# Patient Record
Sex: FEMALE | Race: BLACK OR AFRICAN AMERICAN | Employment: PART TIME | URBAN - METROPOLITAN AREA
[De-identification: names, ages, dates, MRNs, and addresses within clinical notes are randomized per-mention and may not be internally consistent; named-entity substitution may affect disease eponyms.]

---

## 2017-11-18 ENCOUNTER — OFFICE VISIT (OUTPATIENT)
Dept: URGENT CARE | Facility: URGENT CARE | Age: 22
End: 2017-11-18
Payer: COMMERCIAL

## 2017-11-18 VITALS
DIASTOLIC BLOOD PRESSURE: 77 MMHG | WEIGHT: 293 LBS | OXYGEN SATURATION: 100 % | SYSTOLIC BLOOD PRESSURE: 145 MMHG | BODY MASS INDEX: 43.4 KG/M2 | HEIGHT: 69 IN | TEMPERATURE: 97.4 F | HEART RATE: 92 BPM

## 2017-11-18 DIAGNOSIS — B37.31 YEAST VAGINITIS: ICD-10-CM

## 2017-11-18 DIAGNOSIS — N92.6 IRREGULAR MENSTRUAL CYCLE: ICD-10-CM

## 2017-11-18 DIAGNOSIS — N76.0 BACTERIAL VAGINOSIS: ICD-10-CM

## 2017-11-18 DIAGNOSIS — B96.89 BACTERIAL VAGINOSIS: ICD-10-CM

## 2017-11-18 DIAGNOSIS — R10.2 SUPRAPUBIC ABDOMINAL PAIN: Primary | ICD-10-CM

## 2017-11-18 LAB
ALBUMIN UR-MCNC: NEGATIVE MG/DL
APPEARANCE UR: CLEAR
BETA HCG QUAL IFA URINE: NEGATIVE
BILIRUB UR QL STRIP: NEGATIVE
COLOR UR AUTO: YELLOW
GLUCOSE UR STRIP-MCNC: NEGATIVE MG/DL
HGB UR QL STRIP: NEGATIVE
KETONES UR STRIP-MCNC: NEGATIVE MG/DL
LEUKOCYTE ESTERASE UR QL STRIP: NEGATIVE
NITRATE UR QL: NEGATIVE
PH UR STRIP: 6 PH (ref 5–7)
SOURCE: NORMAL
SP GR UR STRIP: 1.02 (ref 1–1.03)
SPECIMEN SOURCE: ABNORMAL
UROBILINOGEN UR STRIP-ACNC: 0.2 EU/DL (ref 0.2–1)
WET PREP SPEC: ABNORMAL

## 2017-11-18 PROCEDURE — 87491 CHLMYD TRACH DNA AMP PROBE: CPT | Performed by: INTERNAL MEDICINE

## 2017-11-18 PROCEDURE — 99203 OFFICE O/P NEW LOW 30 MIN: CPT | Performed by: INTERNAL MEDICINE

## 2017-11-18 PROCEDURE — 87210 SMEAR WET MOUNT SALINE/INK: CPT | Performed by: INTERNAL MEDICINE

## 2017-11-18 PROCEDURE — 84703 CHORIONIC GONADOTROPIN ASSAY: CPT | Performed by: INTERNAL MEDICINE

## 2017-11-18 PROCEDURE — 81003 URINALYSIS AUTO W/O SCOPE: CPT | Performed by: INTERNAL MEDICINE

## 2017-11-18 PROCEDURE — 87591 N.GONORRHOEAE DNA AMP PROB: CPT | Performed by: INTERNAL MEDICINE

## 2017-11-18 RX ORDER — METRONIDAZOLE 500 MG/1
500 TABLET ORAL 2 TIMES DAILY
Qty: 14 TABLET | Refills: 0 | Status: SHIPPED | OUTPATIENT
Start: 2017-11-18 | End: 2020-10-12

## 2017-11-18 RX ORDER — FLUCONAZOLE 150 MG/1
150 TABLET ORAL ONCE
Qty: 1 TABLET | Refills: 0 | Status: SHIPPED | OUTPATIENT
Start: 2017-11-18 | End: 2017-11-18

## 2017-11-18 RX ORDER — METRONIDAZOLE 7.5 MG/G
1 GEL VAGINAL 2 TIMES DAILY
Qty: 70 G | Refills: 0 | Status: SHIPPED | OUTPATIENT
Start: 2017-11-18 | End: 2017-11-25

## 2017-11-18 ASSESSMENT — ENCOUNTER SYMPTOMS
CONSTIPATION: 0
COUGH: 0
DIARRHEA: 0
SORE THROAT: 0
BRUISES/BLEEDS EASILY: 0
FEVER: 0
DYSURIA: 1
NAUSEA: 0
BLOOD IN STOOL: 0
CHILLS: 0
VOMITING: 0
HEADACHES: 0
HEMATURIA: 0
FREQUENCY: 0

## 2017-11-18 NOTE — NURSING NOTE
"Chief Complaint   Patient presents with     Urgent Care     Abdominal Pain     c/o stomach pain for 1 day       Initial /77  Pulse 92  Temp 97.4  F (36.3  C) (Tympanic)  Ht 5' 9\" (1.753 m)  Wt (!) 330 lb (149.7 kg)  LMP 09/25/2017  SpO2 100%  Breastfeeding? No  BMI 48.73 kg/m2 Estimated body mass index is 48.73 kg/(m^2) as calculated from the following:    Height as of this encounter: 5' 9\" (1.753 m).    Weight as of this encounter: 330 lb (149.7 kg).  Medication Reconciliation: complete   Susan Goldstein MA    "

## 2017-11-18 NOTE — PATIENT INSTRUCTIONS
abd pains Could be related to abnormal menstrual cycle and build up of period blood  Or the bacterial vaginosis / yeast  Try ibuprofen with food 3 x day for next few days  Recheck with primary clinic next week.    treatment for bacterial vaginosis & yeast infection    Specimen Description 11/18/2017  1:45 PM 83   Vagina   Wet Prep (Abnormal) 11/18/2017  1:45 PM 83   Yeast seen   Wet Prep (Abnormal) 11/18/2017  1:45 PM 83   Clue cells seen   Wet Prep 11/18/2017  1:45 PM 83   No Trichomonas seen     Component      Latest Ref Rng & Units 11/18/2017   Color Urine       Yellow   Appearance Urine       Clear   Glucose Urine      NEG:Negative mg/dL Negative   Bilirubin Urine      NEG:Negative Negative   Ketones Urine      NEG:Negative mg/dL Negative   Specific Gravity Urine      1.003 - 1.035 1.025   Blood Urine      NEG:Negative Negative   pH Urine      5.0 - 7.0 pH 6.0   Protein Albumin Urine      NEG:Negative mg/dL Negative   Urobilinogen Urine      0.2 - 1.0 EU/dL 0.2   Nitrite Urine      NEG:Negative Negative   Leukocyte Esterase Urine      NEG:Negative Negative   Source       Midstream Urine   Beta HCG Qual IFA Urine      NEG:Negative    Negative           Vaginal Infection: Understanding the Vaginal Environment  The vagina is a canal. It connects the uterus (womb) to the outside of the body. The vagina is home to many types of bacteria and other tiny organisms. These different bacteria most often stay balanced in number. This keeps the vagina healthy. If the balance changes, an infection may result.   A Healthy Environment  Many types of bacteria are present in a healthy vagina. They don t cause problems if their amounts stay balanced. Small amounts of yeast may also be present without causing problems. The most common type of bacteria in the vagina is lactobacillus. It helps keep the vagina at a low pH. A low pH keeps bad bacteria from taking over.  Normal Vaginal Discharge  The vagina makes fluid. It is sent out  as discharge. This keeps the vagina healthy. Normal discharge can be clear, white, or yellowish. Most women find that normal discharge varies in amount and color through the month.  An Unhealthy Environment  The vaginal environment may get out of balance. This may result in a vaginal infection. There are a few reasons this can happen. The pH may have changed. The amount of one organism, such as yeast, may increase. Or an outside organism may get into the vagina and throw off the balance:    Bacterial vaginosis (BV). BV is due to an imbalance in the normal bacteria in the vagina. Lactobacillus bacteria decrease. As a result, the numbers of bad bacteria increase.    Candidiasis(yeast infection). Yeast is a type of fungus. A yeast infection occurs when yeast cells in the vagina increase. They then attack vaginal tissues. A type of yeast called Candida albicans is often involved.    Trichomoniasis ( trich ). Trich is a parasite. It is passed from one person to another during sex. Men with trich often don t have any symptoms. In women, it can take weeks or months before symptoms appear.    5766-5399 [employer]. [address] [place] [address]. All rights reserved. This information is not intended as a substitute for professional medical care. Always follow your healthcare professional's instructions.  This information has been modified by your health care provider with permission from the publisher.

## 2017-11-18 NOTE — MR AVS SNAPSHOT
After Visit Summary   11/18/2017    Boogie Linares    MRN: [id number]           Patient Information     Date Of Birth          1995        Visit Information        Provider Department      11/18/2017 12:35 PM Patricia Pickering MD [facility]        Today's Diagnoses     Suprapubic abdominal pain    -  1    Irregular menstrual cycle        Bacterial vaginosis        Yeast vaginitis          Care Instructions    abd pains Could be related to abnormal menstrual cycle and build up of period blood  Or the bacterial vaginosis / yeast  Try ibuprofen with food 3 x day for next few days  Recheck with primary clinic next week.    treatment for bacterial vaginosis & yeast infection    Specimen Description 11/18/2017  1:45 PM 83   Vagina   Wet Prep (Abnormal) 11/18/2017  1:45 PM 83   Yeast seen   Wet Prep (Abnormal) 11/18/2017  1:45 PM 83   Clue cells seen   Wet Prep 11/18/2017  1:45 PM 83   No Trichomonas seen     Component      Latest Ref Rng & Units 11/18/2017   Color Urine       Yellow   Appearance Urine       Clear   Glucose Urine      NEG:Negative mg/dL Negative   Bilirubin Urine      NEG:Negative Negative   Ketones Urine      NEG:Negative mg/dL Negative   Specific Gravity Urine      1.003 - 1.035 1.025   Blood Urine      NEG:Negative Negative   pH Urine      5.0 - 7.0 pH 6.0   Protein Albumin Urine      NEG:Negative mg/dL Negative   Urobilinogen Urine      0.2 - 1.0 EU/dL 0.2   Nitrite Urine      NEG:Negative Negative   Leukocyte Esterase Urine      NEG:Negative Negative   Source       Midstream Urine   Beta HCG Qual IFA Urine      NEG:Negative    Negative           Vaginal Infection: Understanding the Vaginal Environment  The vagina is a canal. It connects the uterus (womb) to the outside of the body. The vagina is home to many types of bacteria and other tiny organisms. These different bacteria most often stay balanced in number. This keeps the vagina healthy. If  the balance changes, an infection may result.   A Healthy Environment  Many types of bacteria are present in a healthy vagina. They don t cause problems if their amounts stay balanced. Small amounts of yeast may also be present without causing problems. The most common type of bacteria in the vagina is lactobacillus. It helps keep the vagina at a low pH. A low pH keeps bad bacteria from taking over.  Normal Vaginal Discharge  The vagina makes fluid. It is sent out as discharge. This keeps the vagina healthy. Normal discharge can be clear, white, or yellowish. Most women find that normal discharge varies in amount and color through the month.  An Unhealthy Environment  The vaginal environment may get out of balance. This may result in a vaginal infection. There are a few reasons this can happen. The pH may have changed. The amount of one organism, such as yeast, may increase. Or an outside organism may get into the vagina and throw off the balance:    Bacterial vaginosis (BV). BV is due to an imbalance in the normal bacteria in the vagina. Lactobacillus bacteria decrease. As a result, the numbers of bad bacteria increase.    Candidiasis(yeast infection). Yeast is a type of fungus. A yeast infection occurs when yeast cells in the vagina increase. They then attack vaginal tissues. A type of yeast called Candida albicans is often involved.    Trichomoniasis ( trich ). Trich is a parasite. It is passed from one person to another during sex. Men with trich often don t have any symptoms. In women, it can take weeks or months before symptoms appear.    8251-0411 [employer]. [address] [place] [address]. All rights reserved. This information is not intended as a substitute for professional medical care. Always follow your healthcare professional's instructions.  This information has been modified by your health care provider with permission from the publisher.                Follow-ups after your  "visit        Who to contact     If you have questions or need follow up information about today's clinic visit or your schedule please contact [facility] directly at [phone].  Normal or non-critical lab and imaging results will be communicated to you by MyChart, letter or phone within 4 business days after the clinic has received the results. If you do not hear from us within 7 days, please contact the clinic through MyChart or phone. If you have a critical or abnormal lab result, we will notify you by phone as soon as possible.  Submit refill requests through [employer] or call your pharmacy and they will forward the refill request to us. Please allow 3 business days for your refill to be completed.          Additional Information About Your Visit        [facility] Information     [employer] lets you send messages to your doctor, view your test results, renew your prescriptions, schedule appointments and more. To sign up, go to [email or web address] . Click on \"Log in\" on the left side of the screen, which will take you to the Welcome page. Then click on \"Sign up Now\" on the right side of the page.     You will be asked to enter the access code listed below, as well as some personal information. Please follow the directions to create your username and password.     Your access code is: [email or web address]  Expires: 2018  2:18 PM     Your access code will  in 90 days. If you need help or a new code, please call your [facility] clinic or [phone].        Care EveryWhere ID     This is your Care EveryWhere ID. This could be used by other organizations to access your [facility] medical records  [id number]        Your Vitals Were     Pulse Temperature Height Last Period Pulse Oximetry Breastfeeding?    92 97.4  F (36.3  C) (Tympanic) 5' 9\" (1.753 m) 2017 100% No    BMI (Body Mass Index)                   48.73 kg/m2            Blood Pressure from Last 3 Encounters:   17 145/77    " Weight from Last 3 Encounters:   11/18/17 (!) 330 lb (149.7 kg)              We Performed the Following     *UA reflex to Microscopic and Culture ([facility] [facility] (except [place] and [place])     Beta HCG qual IFA urine - [facility] and [place]     CHLAMYDIA TRACHOMATIS PCR     NEISSERIA GONORRHOEA PCR     Wet prep          Today's Medication Changes          These changes are accurate as of: 11/18/17  2:18 PM.  If you have any questions, ask your nurse or doctor.               Start taking these medicines.        Dose/Directions    fluconazole 150 MG tablet   Commonly known as:  DIFLUCAN   Used for:  Yeast vaginitis   Started by:  Patricia Pickering MD        Dose:  150 mg   Take 1 tablet (150 mg) by mouth once for 1 dose   Quantity:  1 tablet   Refills:  0       metroNIDAZOLE 0.75 % vaginal gel   Commonly known as:  METROGEL   Used for:  Bacterial vaginosis   Started by:  Patricia Pickering MD        Dose:  1 applicator   Place 1 applicator (5 g) vaginally 2 times daily for 7 days   Quantity:  70 g   Refills:  0       metroNIDAZOLE 500 MG tablet   Commonly known as:  FLAGYL   Used for:  Bacterial vaginosis   Started by:  Patricia Pickering MD        Dose:  500 mg   Take 1 tablet (500 mg) by mouth 2 times daily   Quantity:  14 tablet   Refills:  0            Where to get your medicines      [facility] [address] [place] [address] & [address] [address]    Hours:  24-hours Phone:  [phone]     fluconazole 150 MG tablet    metroNIDAZOLE 500 MG tablet         Some of these will need a paper prescription and others can be bought over the counter.  Ask your nurse if you have questions.     Bring a paper prescription for each of these medications     metroNIDAZOLE 0.75 % vaginal gel                Primary Care Provider Office Phone # Fax #    [facility]  [phone]       [address] [place] [address]        Equal Access to Services     RAND TUCKER : Hadii aad ku hadsteven Canela, wacarrida luqadaha, qaybta kaalmada baileyvivienne, yoko karynain hayaaskylar avaloselvis soto laura wiseman. So [facility] [phone].    ATENCIÓN: Si habla español, tiene a farmer disposición servicios gratuitos de asistencia lingüística. Bibiame al [phone].    We comply with applicable federal civil rights laws and [place] laws. We do not discriminate on the basis of race, color, national origin, age, disability, sex, sexual orientation, or gender identity.            Thank you!     Thank you for choosing [facility]  for your care. Our goal is always to provide you with excellent care. Hearing back from our patients is one way we can continue to improve our services. Please take a few minutes to complete the written survey that you may receive in the mail after your visit with us. Thank you!             Your Updated Medication List - Protect others around you: Learn how to safely use, store and throw away your medicines at www.disposemymeds.org.          This list is accurate as of: 11/18/17  2:18 PM.  Always use your most recent med list.                   Brand Name Dispense Instructions for use Diagnosis    ADDERALL PO           fluconazole 150 MG tablet    DIFLUCAN    1 tablet    Take 1 tablet (150 mg) by mouth once for 1 dose    Yeast vaginitis       metroNIDAZOLE 0.75 % vaginal gel    METROGEL    70 g    Place 1 applicator (5 g) vaginally 2 times daily for 7 days    Bacterial vaginosis       metroNIDAZOLE 500 MG tablet    FLAGYL    14 tablet    Take 1 tablet (500 mg) by mouth 2 times daily    Bacterial vaginosis       ZOLOFT PO      Take by mouth daily

## 2017-11-18 NOTE — PROGRESS NOTES
SUBJECTIVE:   Boogie Linares is a 22 year old female presenting with a chief complaint of   Chief Complaint   Patient presents with     Urgent Care     Abdominal Pain     c/o stomach pain for 1 day   .  Takes pill OCP, suppose to get period end of OCt.  Took 3 UPT, all neg  Had intercourse 1 week ago, then had excessive bleeding  [place] yesterday,then no bleeding  Mother brought up concern for STD.    Location: suprapubic last night/today  Pain character: sharp, constant    Course of Illness: stable.  Exacerbated by: nothing  Relieved by: nothing.  Nothing tried  Associated Symptoms:   denies belching / bloating      Surgical History: NO abd surg        Review of Systems   Constitutional: Negative for chills and fever.   HENT: Negative for sore throat.    Respiratory: Negative for cough.    Cardiovascular: Negative for chest pain.   Gastrointestinal: Negative for blood in stool, constipation, diarrhea, melena, nausea and vomiting.   Genitourinary: Positive for dysuria. Negative for frequency, hematuria and urgency.        Hurt to bleed after intercourse yesterday   Neurological: Negative for headaches.   Endo/Heme/Allergies: Does not bruise/bleed easily.         Past Medical History:   Diagnosis Date     Depression      Narcolepsy      Current Outpatient Prescriptions   Medication Sig Dispense Refill     Amphetamine-Dextroamphetamine (ADDERALL PO)        Sertraline HCl (ZOLOFT PO) Take by mouth daily       fluconazole (DIFLUCAN) 150 MG tablet Take 1 tablet (150 mg) by mouth once for 1 dose 1 tablet 0     metroNIDAZOLE (FLAGYL) 500 MG tablet Take 1 tablet (500 mg) by mouth 2 times daily 14 tablet 0     metroNIDAZOLE (METROGEL) 0.75 % vaginal gel Place 1 applicator (5 g) vaginally 2 times daily for 7 days 70 g 0     Social History   Substance Use Topics     Smoking status: Never Smoker     Smokeless tobacco: Never Used     Alcohol use Not on file       OBJECTIVE  /77  Pulse 92  Temp 97.4  F (36.3  " C) (Tympanic)  Ht 5' 9\" (1.753 m)  Wt (!) 330 lb (149.7 kg)  LMP 09/25/2017  SpO2 100%  Breastfeeding? No  BMI 48.73 kg/m2    Physical Exam   Constitutional: She is well-developed, well-nourished, and in no distress.   Cardiovascular: Normal rate and regular rhythm.    Pulmonary/Chest: Effort normal and breath sounds normal.   Abdominal: Soft. Bowel sounds are normal.   Mild suprapubic tenderness without guarding / rebound   Musculoskeletal:   No CVA tenderness       Labs:  Results for orders placed or performed in visit on 11/18/17 (from the past 24 hour(s))   *UA reflex to Microscopic and Culture ([facility] (except [place] and [place])   Result Value Ref Range    Color Urine Yellow     Appearance Urine Clear     Glucose Urine Negative NEG^Negative mg/dL    Bilirubin Urine Negative NEG^Negative    Ketones Urine Negative NEG^Negative mg/dL    Specific Gravity Urine 1.025 1.003 - 1.035    Blood Urine Negative NEG^Negative    pH Urine 6.0 5.0 - 7.0 pH    Protein Albumin Urine Negative NEG^Negative mg/dL    Urobilinogen Urine 0.2 0.2 - 1.0 EU/dL    Nitrite Urine Negative NEG^Negative    Leukocyte Esterase Urine Negative NEG^Negative    Source Midstream Urine    Beta HCG qual IFA urine - [facility] [place]   Result Value Ref Range    Beta HCG Qual IFA Urine Negative NEG^Negative      Wet prep   Result Value Ref Range    Specimen Description Vagina     Wet Prep Yeast seen (A)     Wet Prep Clue cells seen (A)     Wet Prep No Trichomonas seen        X-Ray was not done.    ASSESSMENT:      ICD-10-CM    1. Suprapubic abdominal pain R10.2 *UA reflex to Microscopic and Culture ([facility] (except [place] and [place])     Wet prep     NEISSERIA GONORRHOEA PCR     CHLAMYDIA TRACHOMATIS PCR   2. Irregular menstrual cycle N92.6 Beta HCG qual IFA urine - [facility] and [place]   3. Bacterial vaginosis N76.0 metroNIDAZOLE (FLAGYL) 500 MG tablet    B96.89 metroNIDAZOLE (METROGEL) 0.75 % " vaginal gel   4. Yeast vaginitis B37.3 fluconazole (DIFLUCAN) 150 MG tablet        Medical Decision Making:        PLAN:      Patient Instructions     abd pains Could be related to abnormal menstrual cycle and build up of period blood  Or the bacterial vaginosis / yeast  Try ibuprofen with food 3 x day for next few days  Recheck with primary clinic next week.    treatment for bacterial vaginosis & yeast infection    Specimen Description 11/18/2017  1:45 PM 83   Vagina   Wet Prep (Abnormal) 11/18/2017  1:45 PM 83   Yeast seen   Wet Prep (Abnormal) 11/18/2017  1:45 PM 83   Clue cells seen   Wet Prep 11/18/2017  1:45 PM 83   No Trichomonas seen     Component      Latest Ref Rng & Units 11/18/2017   Color Urine       Yellow   Appearance Urine       Clear   Glucose Urine      NEG:Negative mg/dL Negative   Bilirubin Urine      NEG:Negative Negative   Ketones Urine      NEG:Negative mg/dL Negative   Specific Gravity Urine      1.003 - 1.035 1.025   Blood Urine      NEG:Negative Negative   pH Urine      5.0 - 7.0 pH 6.0   Protein Albumin Urine      NEG:Negative mg/dL Negative   Urobilinogen Urine      0.2 - 1.0 EU/dL 0.2   Nitrite Urine      NEG:Negative Negative   Leukocyte Esterase Urine      NEG:Negative Negative   Source       Midstream Urine   Beta HCG Qual IFA Urine      NEG:Negative    Negative           Vaginal Infection: Understanding the Vaginal Environment  The vagina is a canal. It connects the uterus (womb) to the outside of the body. The vagina is home to many types of bacteria and other tiny organisms. These different bacteria most often stay balanced in number. This keeps the vagina healthy. If the balance changes, an infection may result.   A Healthy Environment  Many types of bacteria are present in a healthy vagina. They don t cause problems if their amounts stay balanced. Small amounts of yeast may also be present without causing problems. The most common type of bacteria in the vagina is lactobacillus.  It helps keep the vagina at a low pH. A low pH keeps bad bacteria from taking over.  Normal Vaginal Discharge  The vagina makes fluid. It is sent out as discharge. This keeps the vagina healthy. Normal discharge can be clear, white, or yellowish. Most women find that normal discharge varies in amount and color through the month.  An Unhealthy Environment  The vaginal environment may get out of balance. This may result in a vaginal infection. There are a few reasons this can happen. The pH may have changed. The amount of one organism, such as yeast, may increase. Or an outside organism may get into the vagina and throw off the balance:    Bacterial vaginosis (BV). BV is due to an imbalance in the normal bacteria in the vagina. Lactobacillus bacteria decrease. As a result, the numbers of bad bacteria increase.    Candidiasis(yeast infection). Yeast is a type of fungus. A yeast infection occurs when yeast cells in the vagina increase. They then attack vaginal tissues. A type of yeast called Candida albicans is often involved.    Trichomoniasis ( trich ). Trich is a parasite. It is passed from one person to another during sex. Men with trich often don t have any symptoms. In women, it can take weeks or months before symptoms appear.    1808-0463 [employer] [address] [place] [address], [place] [address]. All rights reserved. This information is not intended as a substitute for professional medical care. Always follow your healthcare professional's instructions.  This information has been modified by your health care provider with permission from the publisher.

## 2017-11-20 LAB
C TRACH DNA SPEC QL NAA+PROBE: NEGATIVE
N GONORRHOEA DNA SPEC QL NAA+PROBE: NEGATIVE
SPECIMEN SOURCE: NORMAL
SPECIMEN SOURCE: NORMAL

## 2019-02-05 ENCOUNTER — RECORDS - HEALTHEAST (OUTPATIENT)
Dept: ADMINISTRATIVE | Facility: OTHER | Age: 24
End: 2019-02-05

## 2020-10-12 ENCOUNTER — OFFICE VISIT (OUTPATIENT)
Dept: OBGYN | Facility: CLINIC | Age: 25
End: 2020-10-12
Payer: COMMERCIAL

## 2020-10-12 VITALS — HEART RATE: 77 BPM | BODY MASS INDEX: 48.58 KG/M2 | WEIGHT: 293 LBS

## 2020-10-12 DIAGNOSIS — Z31.69 ENCOUNTER FOR PRECONCEPTION CONSULTATION: ICD-10-CM

## 2020-10-12 DIAGNOSIS — N92.0 MENORRHAGIA WITH REGULAR CYCLE: Primary | ICD-10-CM

## 2020-10-12 DIAGNOSIS — E66.01 MORBID OBESITY (H): ICD-10-CM

## 2020-10-12 PROCEDURE — 99203 OFFICE O/P NEW LOW 30 MIN: CPT | Performed by: OBSTETRICS & GYNECOLOGY

## 2020-10-12 SDOH — ECONOMIC STABILITY: TRANSPORTATION INSECURITY
IN THE PAST 12 MONTHS, HAS LACK OF TRANSPORTATION KEPT YOU FROM MEETINGS, WORK, OR FROM GETTING THINGS NEEDED FOR DAILY LIVING?: NOT ASKED

## 2020-10-12 SDOH — ECONOMIC STABILITY: TRANSPORTATION INSECURITY
IN THE PAST 12 MONTHS, HAS THE LACK OF TRANSPORTATION KEPT YOU FROM MEDICAL APPOINTMENTS OR FROM GETTING MEDICATIONS?: NOT ASKED

## 2020-10-12 SDOH — ECONOMIC STABILITY: FOOD INSECURITY: WITHIN THE PAST 12 MONTHS, THE FOOD YOU BOUGHT JUST DIDN'T LAST AND YOU DIDN'T HAVE MONEY TO GET MORE.: NOT ASKED

## 2020-10-12 SDOH — ECONOMIC STABILITY: FOOD INSECURITY: WITHIN THE PAST 12 MONTHS, YOU WORRIED THAT YOUR FOOD WOULD RUN OUT BEFORE YOU GOT MONEY TO BUY MORE.: NOT ASKED

## 2020-10-12 SDOH — ECONOMIC STABILITY: INCOME INSECURITY: HOW HARD IS IT FOR YOU TO PAY FOR THE VERY BASICS LIKE FOOD, HOUSING, MEDICAL CARE, AND HEATING?: NOT ASKED

## 2020-10-12 NOTE — PATIENT INSTRUCTIONS
If you have any questions regarding your visit, Please contact your care team.     [employer]: [phone]  [facility] CLINIC HOURS TELEPHONE NUMBER       Killian Milton M.D.      Neal Wilks-Medical Assistant    Yoly-  Greta-     Monday-[place]  8:00a.m-4:45 p.m  Tuesday-[place]  9:00a.m-4:00 p.m  Wednesday-[place] 8:00a.m-4:45 p.m.  Thursday-[place]  8:00a.m-4:45 p.m.  Friday-[place]  8:00a.m-4:45 p.m. [facility]  [address]  [place], [place] [address] [phone] ask [facility]  [phone] Fax  Imaging [phone]    [facility]  [address]  [place], [place] [address] [phone]    [facility]  [address] [place] [id number] [phone] [facility]  [phone] Fax  Imaging [phone]     Urgent Care locations:    [facility] Monday-Friday  5 pm - 9 pm  Saturday and Sunday   9 am - 5 pm  Monday-Friday   11 am - 9 pm  Saturday and Sunday   9 am - 5 pm   [phone]   If you need a medication refill, please contact your pharmacy. Please allow 3 business days for your refill to be completed.  As always, Thank you for trusting us with your healthcare needs!

## 2020-10-13 NOTE — PROGRESS NOTES
OB-GYN Problem-Oriented Visit or Consultation      Boogie Linares is a 25 year old year old P 0 who presents with a chief complaint of history of left salpingectomy for ectopic.  Referred by self.  Patient's last menstrual period was 07/14/2020 (exact date).    HPI:     Had planned pregnancy this yr but had laparoscopy with left salpingectomy for ectopic pregnancy 6/9/20 at [facility] per patient. Concerned about pelvic condition following a psychic reading. Desires ultrasound. LMP 10/11. Menorrhagia. Menses q 28-30 days x 4-5 days. No pain. Contraceptive method is none.    Past medical, obstetrical, surgical, family and social history reviewed and as noted or updated in chart.     Allergies, meds and supplements are as noted or updated in chart.      ROS:   Systems reviewed include:  constitutional, breast, gastrointestinal, genitourinary, psychological, hematologic/lymphatic and endocrine.    These systems were negative for significant symptoms except for the following additional: none; see HPI.    EXAM:  VS as noted. Pulse 77   Wt 149.2 kg (329 lb)   LMP 07/14/2020 (Exact Date)   Breastfeeding No   BMI 48.58 kg/m    Constitutionally normal.     Exam not repeated at this time.    Assessment:   Encounter Diagnoses   Name Primary?     Menorrhagia with regular cycle Yes     Morbid obesity (H)          Plan and Recommendations:   Total encounter time (physician together with patient) = 30min. Direct counseling, education and care coordination time (physician together with patient) = 20min. This included the following:   I reviewed the condition, causes, differential diagnosis, prognosis, evaluation and management considerations and options.  Questions answered and information given. See orders.     Check pelvic ultrasound. Discussed later HSG if unable to conceive within a year.   Preconception counseling reviewed as needed including cycle timing and optimization, medical status, meds, vaccines, exposures,  nutrition, folic acid, family history, genetic screening (CF carrier scr, etc.), social issues and any applicable specific risk factors.   Encouraged weight loss.       A/P:  Boogie was seen today for follow up.    Diagnoses and all orders for this visit:    Menorrhagia with regular cycle  -     US Pelvic Complete with Transvaginal; Future    Morbid obesity (H)        Killian Milton MD

## 2020-10-14 ENCOUNTER — ANCILLARY PROCEDURE (OUTPATIENT)
Dept: ULTRASOUND IMAGING | Facility: CLINIC | Age: 25
End: 2020-10-14
Attending: OBSTETRICS & GYNECOLOGY
Payer: COMMERCIAL

## 2020-10-14 DIAGNOSIS — N92.0 MENORRHAGIA WITH REGULAR CYCLE: ICD-10-CM

## 2021-01-11 ENCOUNTER — PRENATAL OFFICE VISIT (OUTPATIENT)
Dept: OBGYN | Facility: CLINIC | Age: 26
End: 2021-01-11
Payer: COMMERCIAL

## 2021-01-11 VITALS — BODY MASS INDEX: 43.4 KG/M2 | HEIGHT: 69 IN | WEIGHT: 293 LBS

## 2021-01-11 DIAGNOSIS — Z34.90 SUPERVISION OF NORMAL PREGNANCY: Primary | ICD-10-CM

## 2021-01-11 PROCEDURE — 99207 PR NO CHARGE NURSE ONLY: CPT

## 2021-01-11 ASSESSMENT — MIFFLIN-ST. JEOR: SCORE: 2255.89

## 2021-01-11 NOTE — PROGRESS NOTES
Telephone visit with pt for New Prenatal Intake and Education. This is patient's second pregnancy. She had a recent ectopic pregnancy on 6/2/2020.  Pt requests to be seen right away by Dr. Milton for prenatal care and US rather than waiting for 10-12 weeks due to this.  Handouts reviewed and given. Scheduled for New Prenatal with Dr. iMlton on 1/18/2021.       Prenatal OB Questionnaire  Patient supplied answers from flow sheet for:  Prenatal OB Questionnaire.  Past Medical History  Diabetes?: No  Hypertension : No  Heart disease, mitral valve prolapse or rheumatic fever?: No  An autoimmune disease such as lupus or rheumatoid arthritis?: No  Kidney disease or urinary tract infection?: No  Epilepsy, seizures or spells?: No  Migraine headaches?: No  A stroke or loss of function or sensation?: No  Any other neurological problems?: No  Have you ever been treated for depression?: (!) Yes  Are you having problems with crying spells or loss of self-esteem?: No  Have you ever required psychiatric care?: No  Have you ever had hepatitis, liver disease or jaundice?: No  Have you been treated for blood clots in your veins, deep vein thromosis, inflammation in the veins, thrombosis, phlebitis, pulmonary embolism or varicosities?: No  Have you had excessive bleeding after surgery or dental work?: No  Do you bleed more than other women after a cut or scratch?: No  Do you have a history of anemia?: No  Have you ever had thyroid problems or taken thyroid medication?: No   Do you have any endocrine problems?: No  Have you ever been in a major accident or suffered serious trauma?: No  Within the last year, has anyone hit, slapped, kicked or otherwise hurt you?: No  In the last year, has anyone forced you to have sex when you didn't want to?: No    Past Medical History 2   Have you ever received a blood transfusion?: (!) Yes(2020 during ectopic.  pt believes she recieved blood)  Would you refuse a blood transfusion if a doctor judged it to  be medically necessary?: No  Does anyone in your home smoke?: No  Do you use tobacco products?: No  Do you drink beer, wine or hard liquor?: No  Do you use any of the following: marijuana, speed, cocaine, heroin, hallucinogens or other drugs?: No   Is your blood type Rh negative?: Unknown  Have you ever had abnormal antibodies in your blood?: Unknown  Have you ever had asthma?: Unknown  Have you ever had tuberculosis?: Unknown  Do you have any allergies to drugs or over-the-counter medications?: (!) Yes  Allergies: Dust Mites, Aspartame, Ethanol, Venlafaxine, Hydrochloride, Sertraline: No  Have you had any breast problems?: No  Have you ever ?: No  Have you had any gynecological surgical procedures such as cervical conization, a LEEP procedure, laser treatment, cryosurgery of the cervix or a dilation and curettage, etc?: No  Have you ever had any other surgical procedures?: (!) Yes  Have you been hospitalized for a nonsurgical reason excluding normal delivery?: No  Have you ever had any anesthetic complications?: No  Have you ever had an abnormal pap smear?: No    Past Medical History (Continued)  Do you have a history of abnormalities of the uterus?: No  Did your mother take ASHLY or any other hormones when she was pregnant with you?: No  Did it take you more than a year to become pregnant?: No  Have you ever been evaluated or treated for infertility?: No  Is there a history of medical problems in your family, which you feel may be important to this pregnancy?: No  Do you have any other problems we have not asked about which you feel may be important to this pregnancy?: No    Symptoms since last menstrual period  Do you have any of the following symptoms: abdominal pain, blood in stools or urine, chest pain, shortness of breath, coughing or vomiting up blood, your heart racing or skipping beats, nausea and vomiting, pain on urination or vaginal discharge or bleed: No  Current medications, including  over-the-counter medications, you are using? (If not applicable answer none): prenatal vitamins.  Zoloft  Will the patient be 35 years old or older at the time of delivery?: No    Has the patient, baby's father or anyone in either family had:  Thalassemia (Italian, Greek, Mediterranean or  background only) and an MCV result less than 80?: No  Neural tube defect such as meningomyelocele, spina bifida or anencephaly?: No  Congenital heart defect?: No  Down's Syndrome?: No  Jose Roberto-Sachs disease (Adventism, Cajun, Syrian-[place])?: No  Sickle cell disease or trait ()?: No  Hemophilia or other inherited problems of blood?: No  Muscular dystrophy?: No  Cystic fibrosis?: No  [place]'s chorea?: No  Mental retardation/autism?: No  Any other inherited genetic or chromosomal disorder?: No  Maternal metabolic disorder (e.g Insulin-dependent diabetes, PKU)?: No  A child with birth defects not listed above?: No  Recurrent pregnancy loss or stillbirth?: No   Has the patient had any medications/street drugs/alcohol since her last menstrual period?: (!) Yes(marijuana)  Does the patient or baby's father have any other genetic risks?: No    Infection History   Do you object to being tested for Hepatitis B?: No  Do you object to being tested for HIV?: No   Do you feel that you are at high risk for coming in contact with the AIDS virus?: No  Have you ever been treated for tuberculosis?: No  Have you ever had a positive skin test for tuberculosis?: No  Do you live with someone who has tuberculosis?: No  Have you ever been exposed to tuberculosis?: No  Do you have genital herpes?: No  Does your partner have genital herpes?: No  Have you had a viral illness since your last period?: No  Have you ever had gonorrhea, chlamydia, syphilis, venereal warts, trichomoniasis, pelvic inflammatory disease or any other sexually transmitted disease?: No  Do you know if you are a genital group B streptococcus carrier?: Unknown  Have you had  chicken pox/varicella?: (!) Yes   Have you been vaccinated against chicken Pox?: No  Have you had any other infectious diseases?: No      Allergies as of 1/11/2021:    Allergies as of 01/11/2021 - Reviewed 10/13/2020   Allergen Reaction Noted     Penicillins  11/18/2017     Amoxicillin Rash 11/18/2017     Erythromycin Rash 11/18/2017       Current medications are:  Current Outpatient Medications   Medication Sig Dispense Refill     Amphetamine-Dextroamphetamine (ADDERALL PO)        Sertraline HCl (ZOLOFT PO) Take by mouth daily           Early ultrasound screening tool:    Does patient have irregular periods?  No  Did patient use hormonal birth control in the three months prior to positive urine pregnancy test? No  Is the patient breastfeeding?  No  Is the patient 10 weeks or greater at time of education visit?  No    Jessica Dunn RN

## 2021-01-18 ENCOUNTER — PRENATAL OFFICE VISIT (OUTPATIENT)
Dept: OBGYN | Facility: CLINIC | Age: 26
End: 2021-01-18
Payer: COMMERCIAL

## 2021-01-18 VITALS — BODY MASS INDEX: 43.4 KG/M2 | WEIGHT: 293 LBS | OXYGEN SATURATION: 98 % | HEART RATE: 81 BPM | HEIGHT: 69 IN

## 2021-01-18 DIAGNOSIS — Z34.80 SUPERVISION OF OTHER NORMAL PREGNANCY, ANTEPARTUM: Primary | ICD-10-CM

## 2021-01-18 LAB
ABO + RH BLD: NORMAL
ABO + RH BLD: NORMAL
BLD GP AB SCN SERPL QL: NORMAL
BLOOD BANK CMNT PATIENT-IMP: NORMAL
ERYTHROCYTE [DISTWIDTH] IN BLOOD BY AUTOMATED COUNT: 17.2 % (ref 10–15)
HCT VFR BLD AUTO: 33.2 % (ref 35–47)
HGB BLD-MCNC: 10.5 G/DL (ref 11.7–15.7)
MCH RBC QN AUTO: 24.8 PG (ref 26.5–33)
MCHC RBC AUTO-ENTMCNC: 31.6 G/DL (ref 31.5–36.5)
MCV RBC AUTO: 79 FL (ref 78–100)
PLATELET # BLD AUTO: 372 10E9/L (ref 150–450)
RBC # BLD AUTO: 4.23 10E12/L (ref 3.8–5.2)
SPECIMEN EXP DATE BLD: NORMAL
WBC # BLD AUTO: 14.2 10E9/L (ref 4–11)

## 2021-01-18 PROCEDURE — 86762 RUBELLA ANTIBODY: CPT | Performed by: OBSTETRICS & GYNECOLOGY

## 2021-01-18 PROCEDURE — 87086 URINE CULTURE/COLONY COUNT: CPT | Performed by: OBSTETRICS & GYNECOLOGY

## 2021-01-18 PROCEDURE — 86780 TREPONEMA PALLIDUM: CPT | Mod: 90 | Performed by: OBSTETRICS & GYNECOLOGY

## 2021-01-18 PROCEDURE — 87340 HEPATITIS B SURFACE AG IA: CPT | Performed by: OBSTETRICS & GYNECOLOGY

## 2021-01-18 PROCEDURE — 86900 BLOOD TYPING SEROLOGIC ABO: CPT | Performed by: OBSTETRICS & GYNECOLOGY

## 2021-01-18 PROCEDURE — 99207 PR FIRST OB VISIT: CPT | Performed by: OBSTETRICS & GYNECOLOGY

## 2021-01-18 PROCEDURE — 86901 BLOOD TYPING SEROLOGIC RH(D): CPT | Performed by: OBSTETRICS & GYNECOLOGY

## 2021-01-18 PROCEDURE — 85027 COMPLETE CBC AUTOMATED: CPT | Performed by: OBSTETRICS & GYNECOLOGY

## 2021-01-18 PROCEDURE — 87389 HIV-1 AG W/HIV-1&-2 AB AG IA: CPT | Performed by: OBSTETRICS & GYNECOLOGY

## 2021-01-18 PROCEDURE — 99000 SPECIMEN HANDLING OFFICE-LAB: CPT | Performed by: OBSTETRICS & GYNECOLOGY

## 2021-01-18 PROCEDURE — 36415 COLL VENOUS BLD VENIPUNCTURE: CPT | Performed by: OBSTETRICS & GYNECOLOGY

## 2021-01-18 PROCEDURE — 86850 RBC ANTIBODY SCREEN: CPT | Performed by: OBSTETRICS & GYNECOLOGY

## 2021-01-18 ASSESSMENT — MIFFLIN-ST. JEOR: SCORE: 2251.81

## 2021-01-18 NOTE — PATIENT INSTRUCTIONS
If you have any questions regarding your visit, Please contact your care team.     [employer] Services: [phone]  Women s Health CLINIC HOURS TELEPHONE NUMBER       Killian Milton M.D.    Belem-RODRÍGUEZ Lopez-RODRÍGUEZ Wilks-Medical Assistant    Yoly-  Greta-     Monday-[place]  8:00a.m-4:45 p.m  Tuesday-[place]  9:00a.m-4:00 p.m  Wednesday-[place] 8:00a.m-4:45 p.m.  Thursday-[place]  8:00a.m-4:45 p.m.  Friday-[place]  8:00a.m-4:45 p.m. [facility]  [address] [place] [address] [phone] ask for [facility]  [phone] Fax  Imaging [phone]    [facility] Labor and Delivery  [address]  [place], [place] [address] [phone]    [facility]  [address] [place]  [place] [address] [phone] ask [facility]  [phone] Fax  Imaging [phone]     Urgent Care locations:    [place]        [place] Monday-Friday  5 pm - 9 pm  Saturday and Sunday   9 am - 5 pm  Monday-Friday   11 am - 9 pm  Saturday and Sunday   9 am - 5 pm   [phone]   If you need a medication refill, please contact your pharmacy. Please allow 3 business days for your refill to be completed.  As always, Thank you for trusting us with your healthcare needs!

## 2021-01-19 ENCOUNTER — ANCILLARY PROCEDURE (OUTPATIENT)
Dept: ULTRASOUND IMAGING | Facility: CLINIC | Age: 26
End: 2021-01-19
Attending: OBSTETRICS & GYNECOLOGY
Payer: COMMERCIAL

## 2021-01-19 DIAGNOSIS — Z34.80 SUPERVISION OF OTHER NORMAL PREGNANCY, ANTEPARTUM: ICD-10-CM

## 2021-01-19 LAB
BACTERIA SPEC CULT: NORMAL
HBV SURFACE AG SERPL QL IA: NONREACTIVE
HIV 1+2 AB+HIV1 P24 AG SERPL QL IA: NONREACTIVE
RUBV IGG SERPL IA-ACNC: 41 IU/ML
SPECIMEN SOURCE: NORMAL
T PALLIDUM AB SER QL: NONREACTIVE

## 2021-01-19 PROCEDURE — 76817 TRANSVAGINAL US OBSTETRIC: CPT | Performed by: STUDENT IN AN ORGANIZED HEALTH CARE EDUCATION/TRAINING PROGRAM

## 2021-01-19 PROCEDURE — 76801 OB US < 14 WKS SINGLE FETUS: CPT | Performed by: STUDENT IN AN ORGANIZED HEALTH CARE EDUCATION/TRAINING PROGRAM

## 2021-01-19 NOTE — PROGRESS NOTES
Boogie Linares is a 25 year old year old G 2 P 0 who presents for an initial obstetrical visit.  Referred by self.    Currently experiencing normal pregnancy symptoms without particular problems including pain, bleeding, marked vomiting or weight loss except: no exceptions.  This was a planned pregnancy.  Here today alone.   Additional concerns: history of past ectopic and salpingectomy, obesity, depression- controlled with sertraline and will continue this; risks, benefits,and alternatives reviewed.     ROS:     Systems reviewed include constitutional; breast;                 cardiac; respiratory; gastrointestinal; genitourinary;                                musculoskeletal; integumentary; psychological;                                hematologic/lymphatic and endocrine.                  These systems were negative for significant symptoms except                 for the following: none and see above HPI.    Past medical, obstetrical, surgical, family and social history reviewed and as noted or updated in chart.     Allergies, meds and supplements are as noted or updated in chart.                    Episode OB dating, demographic and history forms completed or reviewed.    EXAM:  VS as noted. BMI- Body mass index is 47.12 kg/m .    Relatively recent normal general exam- not repeated now. Unable to obtain BP accurately today.       Boogie was seen today for prenatal care.    Diagnoses and all orders for this visit:    Supervision of other normal pregnancy, antepartum  -     ABO/Rh type and screen  -     CBC with platelets  -     Hepatitis B surface antigen  -     Rubella Antibody IgG Quantitative  -     Urine Culture Aerobic Bacterial  -     HIV Antigen Antibody Combo  -     Treponema Abs w Reflex to RPR and Titer  -     US OB < 14 Weeks Single; Future        PLAN: Counseling included the following: Advice appropriate to gestational age reviewed including pertinent Checklist items. Discussed condition,  tests and general care plan. Symptoms, problems and concerns reviewed. Recommended weight gain discussed. Problem list initiated.   30 minutes spent on the date of encounter doing chart review, history, discussion with patient, and documentation, and further activities as noted above, and review of appropriateness of decision-making for care.  Will discuss low dose aspirin for reduction of preeclampsia risk in patients with previous preeclampsia, chronic HTN, obesity, chronic renal disease, autoimmune disorders, diabetes, or twins.   Will discuss screening for overt diabetes in high risk patients using Hgb A1c.   Consider early 1h GTT in patients with previous gestational diabetes or obesity.   Check ultrasound now. Pap due now- check in 2 months. Orders as noted. RTC in 4 weeks. Discuss special screening tests next.    Killian Milton MD

## 2021-02-04 ENCOUNTER — TELEPHONE (OUTPATIENT)
Dept: OBGYN | Facility: CLINIC | Age: 26
End: 2021-02-04

## 2021-02-04 NOTE — TELEPHONE ENCOUNTER
Reason for Call:  Other US    Detailed comments: pt is 9 wks pregnant. Would like to discuss an Ultrasound. Please call.     Phone Number Patient can be reached at: Home number on file [phone] (home)    Best Time:     Can we leave a detailed message on this number? YES    Call taken on 2/4/2021 at 11:49 AM by Rosi Starr

## 2021-02-11 ENCOUNTER — PRENATAL OFFICE VISIT (OUTPATIENT)
Dept: OBGYN | Facility: CLINIC | Age: 26
End: 2021-02-11
Payer: COMMERCIAL

## 2021-02-11 VITALS
SYSTOLIC BLOOD PRESSURE: 135 MMHG | WEIGHT: 293 LBS | HEART RATE: 74 BPM | OXYGEN SATURATION: 100 % | BODY MASS INDEX: 47.26 KG/M2 | DIASTOLIC BLOOD PRESSURE: 82 MMHG

## 2021-02-11 DIAGNOSIS — O99.019 ANEMIA DURING PREGNANCY: ICD-10-CM

## 2021-02-11 DIAGNOSIS — Z34.80 SUPERVISION OF OTHER NORMAL PREGNANCY, ANTEPARTUM: Primary | ICD-10-CM

## 2021-02-11 LAB
ERYTHROCYTE [DISTWIDTH] IN BLOOD BY AUTOMATED COUNT: 17.1 % (ref 10–15)
FERRITIN SERPL-MCNC: 5 NG/ML (ref 12–150)
HBA1C MFR BLD: 5.1 % (ref 0–5.6)
HCT VFR BLD AUTO: 31.8 % (ref 35–47)
HGB BLD-MCNC: 10.4 G/DL (ref 11.7–15.7)
IRON SATN MFR SERPL: 7 % (ref 15–46)
IRON SERPL-MCNC: 28 UG/DL (ref 35–180)
MCH RBC QN AUTO: 25.9 PG (ref 26.5–33)
MCHC RBC AUTO-ENTMCNC: 32.7 G/DL (ref 31.5–36.5)
MCV RBC AUTO: 79 FL (ref 78–100)
PLATELET # BLD AUTO: 340 10E9/L (ref 150–450)
RBC # BLD AUTO: 4.02 10E12/L (ref 3.8–5.2)
TIBC SERPL-MCNC: 427 UG/DL (ref 240–430)
WBC # BLD AUTO: 10.4 10E9/L (ref 4–11)

## 2021-02-11 PROCEDURE — 83550 IRON BINDING TEST: CPT | Performed by: OBSTETRICS & GYNECOLOGY

## 2021-02-11 PROCEDURE — 85027 COMPLETE CBC AUTOMATED: CPT | Performed by: OBSTETRICS & GYNECOLOGY

## 2021-02-11 PROCEDURE — 99207 PR PRENATAL VISIT: CPT | Performed by: OBSTETRICS & GYNECOLOGY

## 2021-02-11 PROCEDURE — 36415 COLL VENOUS BLD VENIPUNCTURE: CPT | Performed by: OBSTETRICS & GYNECOLOGY

## 2021-02-11 PROCEDURE — 83540 ASSAY OF IRON: CPT | Performed by: OBSTETRICS & GYNECOLOGY

## 2021-02-11 PROCEDURE — 83021 HEMOGLOBIN CHROMOTOGRAPHY: CPT | Mod: 90 | Performed by: OBSTETRICS & GYNECOLOGY

## 2021-02-11 PROCEDURE — 82728 ASSAY OF FERRITIN: CPT | Performed by: OBSTETRICS & GYNECOLOGY

## 2021-02-11 PROCEDURE — 83036 HEMOGLOBIN GLYCOSYLATED A1C: CPT | Performed by: OBSTETRICS & GYNECOLOGY

## 2021-02-11 PROCEDURE — 99000 SPECIMEN HANDLING OFFICE-LAB: CPT | Performed by: OBSTETRICS & GYNECOLOGY

## 2021-02-13 LAB
HGB A1 MFR BLD: 96.3 % (ref 95–97.9)
HGB A2 MFR BLD: 3.1 % (ref 2–3.5)
HGB C MFR BLD: 0 % (ref 0–0)
HGB E MFR BLD: 0 % (ref 0–0)
HGB F MFR BLD: 0.6 % (ref 0–2.1)
HGB FRACT BLD ELPH-IMP: NORMAL
HGB OTHER MFR BLD: 0 % (ref 0–0)
HGB S BLD QL SOLY: NORMAL
HGB S MFR BLD: 0 % (ref 0–0)
PATH INTERP BLD-IMP: NORMAL

## 2021-03-19 ENCOUNTER — PRENATAL OFFICE VISIT (OUTPATIENT)
Dept: OBGYN | Facility: CLINIC | Age: 26
End: 2021-03-19
Payer: COMMERCIAL

## 2021-03-19 VITALS
HEART RATE: 76 BPM | BODY MASS INDEX: 45.93 KG/M2 | WEIGHT: 293 LBS | SYSTOLIC BLOOD PRESSURE: 127 MMHG | OXYGEN SATURATION: 100 % | DIASTOLIC BLOOD PRESSURE: 85 MMHG

## 2021-03-19 DIAGNOSIS — Z34.80 SUPERVISION OF OTHER NORMAL PREGNANCY, ANTEPARTUM: Primary | ICD-10-CM

## 2021-03-19 PROCEDURE — 99207 PR PRENATAL VISIT: CPT | Performed by: OBSTETRICS & GYNECOLOGY

## 2021-03-19 PROCEDURE — G0145 SCR C/V CYTO,THINLAYER,RESCR: HCPCS | Performed by: OBSTETRICS & GYNECOLOGY

## 2021-03-19 RX ORDER — ASPIRIN 81 MG/1
81 TABLET ORAL DAILY
COMMUNITY

## 2021-03-19 NOTE — Clinical Note
Please arrange Level 2 ultrasound with MFM at [facility] site if possible. This is for obesity. I will sign the paper referral order later as needed.

## 2021-03-19 NOTE — PATIENT INSTRUCTIONS
If you have any questions regarding your visit, Please contact your care team.     [employer] Services: [phone]  Women s Health CLINIC HOURS TELEPHONE NUMBER       Killian Milton M.D.    Belem-RODRÍGUEZ Lopez-RODRÍGUEZ Wilks-Medical Assistant    Yoly-  Greta-     Monday-[place]  8:00a.m-4:45 p.m  Tuesday-[place]  9:00a.m-4:00 p.m  Wednesday-[place] 8:00a.m-4:45 p.m.  Thursday-[place]  8:00a.m-4:45 p.m.  Friday-[place]  8:00a.m-4:45 p.m. [facility]  [address] [place] [address] [phone] ask for [facility]  [phone] Fax  Imaging [phone]    [facility] Labor and Delivery  [address]  [place], [place] [address] [phone]    [facility]  [address] [place]  [place] [address] [phone] ask [facility]  [phone] Fax  Imaging [phone]     Urgent Care locations:    [place]        [place] Monday-Friday  5 pm - 9 pm  Saturday and Sunday   9 am - 5 pm  Monday-Friday   11 am - 9 pm  Saturday and Sunday   9 am - 5 pm   [phone]   If you need a medication refill, please contact your pharmacy. Please allow 3 business days for your refill to be completed.  As always, Thank you for trusting us with your healthcare needs!

## 2021-03-19 NOTE — PROGRESS NOTES
No significant signs, symptoms or concerns except some panic moments and had stopped sertraline. Nausea is better. Weight loss discussed. Occasional headache. Taking low dose aspirin. Extra long narrow speculum used.   Discussed special diagnostic and screening tests and plans (y = yes, n = no/declined, p = possibly/considering, d = done already, na = not applicable or past time): first trimester scr with NT and later AFP or with cell free DNA and later AFP = na, cell free DNA= p, CF carrier scr = n, hemoglobinopathy scr= d, SMA, fragile X  and other genetic scr= n, genetic amnio/CVS = n, quad scr = y, survey u/s = n, Level 2 survey u/s with MFM = y (desires this at 18-19 weeks gest).  Counseling included the following: Advice appropriate to gestational age reviewed including pertinent Checklist items. Discussed condition, tests and care plan including risks, benefits, and alternatives. Problem list updated.   20 minutes spent on the date of encounter doing chart review, history, examination, discussion with patient, and documentation, and further activities as noted, and review of appropriateness of decision-making for care, and review of test results, and interpretation of test results.  Quad screening next.  A/P:  Boogie was seen today for prenatal care.    Diagnoses and all orders for this visit:    Supervision of other normal pregnancy, antepartum  -     Pap imaged thin layer screen reflex to HPV if ASCUS - recommend age 25 - 29        Killian Milton MD

## 2021-03-21 ENCOUNTER — HEALTH MAINTENANCE LETTER (OUTPATIENT)
Age: 26
End: 2021-03-21

## 2021-03-23 LAB
COPATH REPORT: NORMAL
PAP: NORMAL

## 2021-03-25 ENCOUNTER — TELEPHONE (OUTPATIENT)
Dept: OBGYN | Facility: CLINIC | Age: 26
End: 2021-03-25

## 2021-03-25 NOTE — TELEPHONE ENCOUNTER
Patient is to have Level 2 ultrasound survey with MFM as noted and this should be arranged- timing per MFM.    From: Killian Milton MD On: 03/19/2021 04:08 PM   To:  Ob/Gyn Ma/Lpn (Pool)   Priority: Routine   Routing Comments:   Please arrange Level 2 ultrasound with MFM at  site if possible. This is for obesity. I will sign the paper referral order later as needed.          Killian Milton MD

## 2021-03-25 NOTE — TELEPHONE ENCOUNTER
M referral filled out for a level 2 US.  Pregnancy episode printed.  Both episode and referral were faxed to [facility].  Placed referral in faxed folder.    Notified pt.    Jessica Dunn RN

## 2021-03-25 NOTE — TELEPHONE ENCOUNTER
Patient is calling states Dr Milton said someone would be calling to schedule he for U/S. She states no one has called her and TC does not see any orders in her chart for this. Please call patient to advise.    LUZ Alcazar

## 2021-03-25 NOTE — TELEPHONE ENCOUNTER
Pt last seen 3/19/2021, currently 15w4d.  Last US done on 1/19/2021 and was normal.    Pt asking for 20 week US orders to be placed as she would like to schedule on 4/13/2021 as this is a date to commemorate a family members passing.    RN states US scheduling usually does not allow anatomy scans to be done sooner than 19weeks but she can ask when scheduling.    RN routing to provider for advisement on US orders.    Belem Kong RN on 3/25/2021 at 12:12 PM

## 2021-05-17 ENCOUNTER — PRENATAL OFFICE VISIT (OUTPATIENT)
Dept: OBGYN | Facility: CLINIC | Age: 26
End: 2021-05-17
Payer: COMMERCIAL

## 2021-05-17 VITALS
DIASTOLIC BLOOD PRESSURE: 70 MMHG | SYSTOLIC BLOOD PRESSURE: 118 MMHG | OXYGEN SATURATION: 99 % | WEIGHT: 293 LBS | BODY MASS INDEX: 45.78 KG/M2 | HEART RATE: 76 BPM

## 2021-05-17 DIAGNOSIS — Z34.80 SUPERVISION OF OTHER NORMAL PREGNANCY, ANTEPARTUM: ICD-10-CM

## 2021-05-17 PROCEDURE — 99207 PR PRENATAL VISIT: CPT | Performed by: OBSTETRICS & GYNECOLOGY

## 2021-05-17 NOTE — PATIENT INSTRUCTIONS
If you have any questions regarding your visit, Please contact your care team.     [employer] Services: [phone]  Women s Health CLINIC HOURS TELEPHONE NUMBER       Killian Milton M.D.    Belem-RODRÍGUEZ Lopez-RODRÍGUEZ Wilks-Medical Assistant    Yoly-  Greta-     Monday-[place]  8:00a.m-4:45 p.m  Tuesday-[place]  9:00a.m-4:00 p.m  Wednesday-[place] 8:00a.m-4:45 p.m.  Thursday-[place]  8:00a.m-4:45 p.m.  Friday-[place]  8:00a.m-4:45 p.m. [facility]  [address] [facility] [address] [facility] [place] [address] [phone] ask for [facility]  [phone] Fax  Imaging [phone]    [facility] Labor and Delivery  [address]  [place], [place] [address] [phone]    [facility]  [address]  [place] [address] [phone] ask [employer]  [phone] Fax  Imaging [phone]     Urgent Care locations:    [place]        [place] Monday-Friday  5 pm - 9 pm  Saturday and Sunday   9 am - 5 pm  Monday-Friday   11 am - 9 pm  Saturday and Sunday   9 am - 5 pm   [phone]   If you need a medication refill, please contact your pharmacy. Please allow 3 business days for your refill to be completed.  As always, Thank you for trusting us with your healthcare needs!

## 2021-05-17 NOTE — PROGRESS NOTES
Boogie is a 25 y.o.  at 23w1d GA.     No significant signs, symptoms or concerns. Patient met with M for U/S on 21 that showed no concerning findings. M recommended continued evaluation of fetal growth with repeat U/S q 4-6 weeks with plan for weekly BPP beginning at 36 weeks. Fundal height measuring 24 cm today and FHT at 145 bpm.    Counseling included the following: Advice appropriate to gestational age reviewed including pertinent Checklist items. Discussed condition, tests and care plan including risks, benefits, and alternatives. Problem list updated.     20 minutes spent on the date of encounter doing chart review, history, examination, discussion with patient, documentation, review of appropriateness of decision-making for care, review of test results, interpretation of test results, and review of outside records.     A/P:  1. Supervision of other normal pregnancy, antepartum  - Hemoglobin; Future  - Glucose tolerance gest screen 1 hour; Future    CARMEN Elliott-S2  I was present with the student who participated in the service and in the documentation of the note. I have verified the history and personally performed the physical exam, medical decision making, and provided counseling in the time period documented. I agree with the assessment and plan of care as documented in the note.  Killian Milton MD

## 2021-06-02 VITALS — BODY MASS INDEX: 47.99 KG/M2 | WEIGHT: 293 LBS

## 2021-06-22 ENCOUNTER — PRENATAL OFFICE VISIT (OUTPATIENT)
Dept: OBGYN | Facility: CLINIC | Age: 26
End: 2021-06-22
Payer: COMMERCIAL

## 2021-06-22 VITALS
DIASTOLIC BLOOD PRESSURE: 80 MMHG | SYSTOLIC BLOOD PRESSURE: 137 MMHG | HEART RATE: 83 BPM | BODY MASS INDEX: 46.67 KG/M2 | OXYGEN SATURATION: 98 % | WEIGHT: 293 LBS

## 2021-06-22 DIAGNOSIS — Z34.80 SUPERVISION OF OTHER NORMAL PREGNANCY, ANTEPARTUM: ICD-10-CM

## 2021-06-22 LAB
GLUCOSE 1H P 50 G GLC PO SERPL-MCNC: 123 MG/DL (ref 60–129)
HGB BLD-MCNC: 9.3 G/DL (ref 11.7–15.7)

## 2021-06-22 PROCEDURE — 99207 PR PRENATAL VISIT: CPT | Performed by: OBSTETRICS & GYNECOLOGY

## 2021-06-22 PROCEDURE — 82950 GLUCOSE TEST: CPT | Performed by: OBSTETRICS & GYNECOLOGY

## 2021-06-22 PROCEDURE — 36415 COLL VENOUS BLD VENIPUNCTURE: CPT | Performed by: OBSTETRICS & GYNECOLOGY

## 2021-06-22 PROCEDURE — 99N1025 PR STATISTIC OBHBG - HEMOGLOBIN: Performed by: OBSTETRICS & GYNECOLOGY

## 2021-06-22 NOTE — PATIENT INSTRUCTIONS
If you have any questions regarding your visit, Please contact your care team.     [employer] Services: [phone]  Women s Health CLINIC HOURS TELEPHONE NUMBER       Killian Milton M.D.    Belem-RODRÍGUEZ Lopez-RODRÍGUEZ Wilks-Medical Assistant    Yoly-  Greta-     Monday-[place]  8:00a.m-4:45 p.m  Tuesday-[place]  9:00a.m-4:00 p.m  Wednesday-[place] 8:00a.m-4:45 p.m.  Thursday-[place]  8:00a.m-4:45 p.m.  Friday-[place]  8:00a.m-4:45 p.m. [facility]  [address] [facility] [place] [address] [phone] ask for [facility]  [phone] Fax  Imaging [phone]    [facility] Labor and Delivery  [address]  [place], [place] [address] [phone]    [facility]  [address]  [place] [address] [phone] ask [facility]  [phone] Fax  Imaging [phone]     Urgent Care locations:    [place]        [place] Monday-Friday  5 pm - 9 pm  Saturday and Sunday   9 am - 5 pm  Monday-Friday   11 am - 9 pm  Saturday and Sunday   9 am - 5 pm   [phone]   If you need a medication refill, please contact your pharmacy. Please allow 3 business days for your refill to be completed.  As always, Thank you for trusting us with your healthcare needs!

## 2021-06-22 NOTE — PROGRESS NOTES
No significant signs, symptoms or concerns. Discussed labor analgesia.    Counseling included the following: Advice appropriate to gestational age reviewed including pertinent Checklist items. Discussed condition, tests and care plan including risks, benefits, and alternatives. Problem list updated.   20 minutes spent on the date of encounter doing chart review, history, examination, discussion with patient, and documentation, and further activities as noted, and review of appropriateness of decision-making for care.  A/P:  Boogie was seen today for prenatal care.    Diagnoses and all orders for this visit:    Supervision of other normal pregnancy, antepartum  -     Glucose tolerance gest screen 1 hour  -     OB hemoglobin        Killian Milton MD

## 2021-06-29 ENCOUNTER — TRANSFERRED RECORDS (OUTPATIENT)
Dept: HEALTH INFORMATION MANAGEMENT | Facility: CLINIC | Age: 26
End: 2021-06-29

## 2021-07-12 ENCOUNTER — PRENATAL OFFICE VISIT (OUTPATIENT)
Dept: OBGYN | Facility: CLINIC | Age: 26
End: 2021-07-12
Payer: COMMERCIAL

## 2021-07-12 VITALS
WEIGHT: 293 LBS | SYSTOLIC BLOOD PRESSURE: 112 MMHG | BODY MASS INDEX: 46.99 KG/M2 | HEART RATE: 67 BPM | DIASTOLIC BLOOD PRESSURE: 71 MMHG

## 2021-07-12 DIAGNOSIS — O99.019 ANEMIA DURING PREGNANCY: Primary | ICD-10-CM

## 2021-07-12 DIAGNOSIS — Z34.80 SUPERVISION OF OTHER NORMAL PREGNANCY, ANTEPARTUM: ICD-10-CM

## 2021-07-12 PROCEDURE — 99207 PR PRENATAL VISIT: CPT | Performed by: OBSTETRICS & GYNECOLOGY

## 2021-07-12 NOTE — PROGRESS NOTES
No significant signs, symptoms or concerns except discussed hospital for delivery. Start oral iron. Continues [facility] follow-up also.   Counseling included the following: Advice appropriate to gestational age reviewed including pertinent Checklist items. Discussed condition, tests and care plan including risks, benefits, and alternatives. Problem list updated.   20 minutes spent on the date of encounter doing chart review, history, examination, discussion with patient, and documentation, and further activities as noted, and review of appropriateness of decision-making for care, and review of test results, and interpretation of test results.  A/P:  Boogie was seen today for prenatal care.    Diagnoses and all orders for this visit:    Anemia during pregnancy    Supervision of other normal pregnancy, antepartum        Killian Milton MD

## 2021-07-12 NOTE — PATIENT INSTRUCTIONS
If you have any questions regarding your visit, Please contact your care team.     [email or web address]: [phone]  [facility] CLINIC HOURS TELEPHONE NUMBER       Killian Milton M.D.    [place]-RODRÍGUEZ Lopez-RODRÍGUEZ Wilks-Medical Assistant    Yoly-  Greta-     Monday-[place]  8:00a.m-4:45 p.m  Tuesday-[place]  9:00a.m-4:00 p.m  Wednesday-[place] 8:00a.m-4:45 p.m.  Thursday-[place]  8:00a.m-4:45 p.m.  Friday-[place]  8:00a.m-4:45 p.m. [facility]  [address] [place] [id number] [phone] ask for [facility]  [phone] Fax  Imaging [phone]    [facility] Delivery  [address]  [place], [place] [address] [phone]    [facility]  [address] [place]  [place] [address] [phone] ask [facility]  [phone] Fax  Imaging [phone]     Urgent Care locations:    [place]        [place] Monday-Friday  5 pm - 9 pm  Saturday and Sunday   9 am - 5 pm  Monday-Friday   11 am - 9 pm  Saturday and Sunday   9 am - 5 pm   [phone]   If you need a medication refill, please contact your pharmacy. Please allow 3 business days for your refill to be completed.  As always, Thank you for trusting us with your healthcare needs!

## 2021-07-16 ENCOUNTER — MYC MEDICAL ADVICE (OUTPATIENT)
Dept: OBGYN | Facility: CLINIC | Age: 26
End: 2021-07-16

## 2021-07-17 ENCOUNTER — MYC MEDICAL ADVICE (OUTPATIENT)
Dept: OBGYN | Facility: CLINIC | Age: 26
End: 2021-07-17

## 2021-07-19 NOTE — TELEPHONE ENCOUNTER
Form printed and filled out to the best of my ability - unsure if patient is completely unable to work or if this was discussed at last visit.   Form faxed to [facility] for Dr. Milton review/signature.    (see other MyChart encounter as well - patient wants this form faxed and to be informed once faxed.)

## 2021-07-20 NOTE — TELEPHONE ENCOUNTER
We can complete this form for patient using pregnancy as diagnosis. I will sign as needed.     Killian Milton MD

## 2021-07-23 ENCOUNTER — TELEPHONE (OUTPATIENT)
Dept: OBGYN | Facility: CLINIC | Age: 26
End: 2021-07-23

## 2021-07-23 NOTE — TELEPHONE ENCOUNTER
[facility] Call Center    Phone Message    May a detailed message be left on voicemail: yes     Reason for Call: Patient calling to get the paperwork for unemployment faxed over to patients work. Patient states work never got the fax. Please advise. Thank you    Action Taken: Message routed to:  [facility] p [phone]    Travel Screening: Not Applicable

## 2021-07-23 NOTE — TELEPHONE ENCOUNTER
See other mychart encounter. Forms were completed and faxed to ROBY marin on 7.20.21     Lashaun Wayne CMA

## 2021-07-23 NOTE — TELEPHONE ENCOUNTER
Forms completed and faxed to ROBY marin at [phone] on 7.20.21.    Returned message to patient to see what number she wanted these faxed to.    [facility] and sent to abstracting for scanning.        Lashaun Bundy CMA

## 2021-07-23 NOTE — TELEPHONE ENCOUNTER
Patient is calling back to update fax number. Fax number is on the bottom of the paper. [phone]. Patient states this has to be done today as it is due today. Please advise. Thank you

## 2021-07-26 ENCOUNTER — PRENATAL OFFICE VISIT (OUTPATIENT)
Dept: OBGYN | Facility: CLINIC | Age: 26
End: 2021-07-26
Payer: COMMERCIAL

## 2021-07-26 VITALS
DIASTOLIC BLOOD PRESSURE: 78 MMHG | SYSTOLIC BLOOD PRESSURE: 112 MMHG | BODY MASS INDEX: 47.05 KG/M2 | WEIGHT: 293 LBS | HEART RATE: 80 BPM

## 2021-07-26 DIAGNOSIS — O99.213 OBESITY IN PREGNANCY, ANTEPARTUM, THIRD TRIMESTER: Primary | ICD-10-CM

## 2021-07-26 PROCEDURE — 99207 PR PRENATAL VISIT: CPT | Performed by: OBSTETRICS & GYNECOLOGY

## 2021-07-26 NOTE — PROGRESS NOTES
Presents for routine  appointment.     No complaints.    No LOF/VB/Ctxs.  +Fetal Movement  ROS:   and GI negative.     /78   Pulse 80   Wt 144.5 kg (318 lb 9.6 oz)   LMP 2020 (Exact Date)   BMI 47.05 kg/m        A/P:  25 year old  at 33w1d DEANNE visit    Pregnancy c/b:  -Class III obesity: Growth ultrasound Q4-6Wks, weekly BPPs after 36wks- scheduled through [facility]  -Depression  -Narcolepsy  -Anemia of pregnancy, on iron    Routine Prenatal Care:  -Tdap- declined  -Discussed plans for contraception - Undecided.  -Discussed when to go to triage, s/s PTL    Follow up in 2 weeks.    Danita Tyson DO

## 2021-08-18 ENCOUNTER — PRENATAL OFFICE VISIT (OUTPATIENT)
Dept: OBGYN | Facility: CLINIC | Age: 26
End: 2021-08-18
Payer: COMMERCIAL

## 2021-08-18 VITALS
HEART RATE: 87 BPM | DIASTOLIC BLOOD PRESSURE: 83 MMHG | SYSTOLIC BLOOD PRESSURE: 127 MMHG | WEIGHT: 293 LBS | BODY MASS INDEX: 47.85 KG/M2 | OXYGEN SATURATION: 97 %

## 2021-08-18 DIAGNOSIS — Z34.80 SUPERVISION OF OTHER NORMAL PREGNANCY, ANTEPARTUM: ICD-10-CM

## 2021-08-18 LAB — HGB BLD-MCNC: 10.4 G/DL (ref 11.7–15.7)

## 2021-08-18 PROCEDURE — 85018 HEMOGLOBIN: CPT | Performed by: OBSTETRICS & GYNECOLOGY

## 2021-08-18 PROCEDURE — 99207 PR PRENATAL VISIT: CPT | Performed by: OBSTETRICS & GYNECOLOGY

## 2021-08-18 PROCEDURE — 36415 COLL VENOUS BLD VENIPUNCTURE: CPT | Performed by: OBSTETRICS & GYNECOLOGY

## 2021-08-18 NOTE — PATIENT INSTRUCTIONS
If you have any questions regarding your visit, Please contact your care team.     [email or web address]: [phone]  [facility] CLINIC HOURS TELEPHONE NUMBER       Killian Milton M.D.    [place]-RODRÍGUE[facility] Lopez-RODRÍGUEZ Wilks-Medical Assistant    Yoly-  Greta-     Monday-[place]  8:00a.m-4:45 p.m  Tuesday-[place]  9:00a.m-4:00 p.m  Wednesday-[place] 8:00a.m-4:45 p.m.  Thursday-[place]  8:00a.m-4:45 p.m.  Friday-[place]  8:00a.m-4:45 p.m. [facility]  [address] [place] [address] [phone] ask for [facility]  [phone] Fax  Imaging [phone]    [facility] Labor and Delivery  [address]  [place], [place] [address] [phone]    [facility]  [address] [place]  [place] [address] [phone] ask [facility]  [phone] Fax  Imaging [phone]     Urgent Care locations:    [place]        [place] Monday-Friday  5 pm - 9 pm  Saturday and Sunday   9 am - 5 pm  Monday-Friday   11 am - 9 pm  Saturday and Sunday   9 am - 5 pm   [phone]   If you need a medication refill, please contact your pharmacy. Please allow 3 business days for your refill to be completed.  As always, Thank you for trusting us with your healthcare needs!

## 2021-08-18 NOTE — PROGRESS NOTES
No significant signs, symptoms or concerns.  Counseling included the following: Advice appropriate to gestational age reviewed including pertinent Checklist items such as potential need for induction prior to due date. Discussed condition, tests and care plan including risks, benefits, and alternatives. Problem list updated.   20 minutes spent on the date of encounter doing chart review, history, examination, discussion with patient, and documentation, and further activities as noted.     Recheck Hgb today since patient has been on oral iron supplementation. Check cervix and GBS next.    A/P:    Boogie was seen today for prenatal care.    Diagnoses and all orders for this visit:    Supervision of other normal pregnancy, antepartum  -     Hemoglobin; Future  -     Hemoglobin    Quan Karimi NP Student   I was present with the student who participated in the service and in the documentation of the note. I have verified the history and personally performed the physical exam, medical decision making, and provided counseling in the time period documented. I agree with the assessment and plan of care as documented in the note.  Killian Milton MD

## 2021-08-30 ENCOUNTER — NURSE TRIAGE (OUTPATIENT)
Dept: NURSING | Facility: CLINIC | Age: 26
End: 2021-08-30

## 2021-08-30 NOTE — TELEPHONE ENCOUNTER
"Calling from the [facility]. 39 weeks 1 day pregnant.   States her first baby and contractions are 2-4 minutes apart for the last 30 minutes.  When she used the bathroom at 5:30 pm there was brownish discharge when she wiped.  Advised patient to seek help at the first aid tent.    Bonnie Brink RN, MA  [facility] Triage Nurse Advisor        Reason for Disposition    [1] First baby (primipara) AND [2] contractions < 6 minutes apart  AND [3] present 2 hours    Vaginal bleeding or spotting    Additional Information    Negative: Passed out (i.e., lost consciousness, collapsed and was not responding)    Negative: Shock suspected (e.g., cold/pale/clammy skin, too weak to stand, low BP, rapid pulse)    Negative: Difficult to awaken or acting confused (e.g., disoriented, slurred speech)    Negative: [1] SEVERE abdominal pain (e.g., excruciating) AND [2] constant AND [3] present > 1 hour    Negative: Severe bleeding (e.g., continuous red blood from vagina, or large blood clots)    Negative: Umbilical cord hanging out of the vagina (shiny, white, curled appearance, \"like telephone cord\")    Negative: Uncontrollable urge to push (i.e., feels like baby is coming out now)    Negative: Can see baby    Negative: Sounds like a life-threatening emergency to the triager    Negative: [1] History of prior delivery (multipara) AND [2] contractions < 10 minutes apart AND [3] present 1 hour    Negative: [1] History of rapid prior delivery AND [2] contractions < 10 minutes apart    Negative: [1] Leakage of fluid from vagina AND [2] green or brown in color    Negative: [1] Leakage of fluid from vagina AND [2] leakage started > 4 hours ago    Protocols used: PREGNANCY - LABOR-A-AH      "

## 2021-08-31 ENCOUNTER — NURSE TRIAGE (OUTPATIENT)
Dept: NURSING | Facility: CLINIC | Age: 26
End: 2021-08-31

## 2021-08-31 NOTE — TELEPHONE ENCOUNTER
"OB Triage Call    Reason for call: contractions 6 minutes apart for about 2 hours    Assessment:contractions 6 minutes apart for about 2 hours, first baby     Plan:go to Labor and delivery    Patient plans to deliver at [place]  Patient's primary OB Provider is Dr. Milton.    Is patient's primary OB from [place]? No- Patient's primary OB provider is a Physician.  Labor and delivery at [place] ([phone]) notified of patient's pending arrival.  Report given to gianna.      38w2d  Estimated Date of Delivery: Sep 12, 2021        OB History    Para Term  AB Living   2 0 0 0 1 0   SAB TAB Ectopic Multiple Live Births   0 0 1 0 0      # Outcome Date GA Lbr Chato/2nd Weight Sex Delivery Anes PTL Lv   2 Current            1 Ectopic 20              Birth Comments: laparoscopic left salpingectomy       No results found for: GBS       Dahiana Lyon RN 21 3:04 AM  [facility] Nurse Advisor    Reason for Disposition    [1] First baby (primipara) AND [2] contractions < 6 minutes apart  AND [3] present 2 hours    Additional Information    Negative: Passed out (i.e., lost consciousness, collapsed and was not responding)    Negative: Shock suspected (e.g., cold/pale/clammy skin, too weak to stand, low BP, rapid pulse)    Negative: Difficult to awaken or acting confused (e.g., disoriented, slurred speech)    Negative: [1] SEVERE abdominal pain (e.g., excruciating) AND [2] constant AND [3] present > 1 hour    Negative: Severe bleeding (e.g., continuous red blood from vagina, or large blood clots)    Negative: Umbilical cord hanging out of the vagina (shiny, white, curled appearance, \"like telephone cord\")    Negative: Uncontrollable urge to push (i.e., feels like baby is coming out now)    Negative: Can see baby    Negative: Sounds like a life-threatening emergency to the triager    Negative: Pregnant < 37 weeks (i.e., )    Negative: [1] Uncertain delivery date AND [2] " possibly pregnant < 37 weeks (i.e., )    Protocols used: PREGNANCY - LABOR-A-AH

## 2021-09-05 ENCOUNTER — HEALTH MAINTENANCE LETTER (OUTPATIENT)
Age: 26
End: 2021-09-05

## 2022-04-17 ENCOUNTER — HEALTH MAINTENANCE LETTER (OUTPATIENT)
Age: 27
End: 2022-04-17

## 2022-10-23 ENCOUNTER — HEALTH MAINTENANCE LETTER (OUTPATIENT)
Age: 27
End: 2022-10-23

## 2023-06-01 ENCOUNTER — HEALTH MAINTENANCE LETTER (OUTPATIENT)
Age: 28
End: 2023-06-01

## 2024-06-02 ENCOUNTER — HEALTH MAINTENANCE LETTER (OUTPATIENT)
Age: 29
End: 2024-06-02